# Patient Record
Sex: MALE | Race: WHITE | NOT HISPANIC OR LATINO | Employment: UNEMPLOYED | URBAN - NONMETROPOLITAN AREA
[De-identification: names, ages, dates, MRNs, and addresses within clinical notes are randomized per-mention and may not be internally consistent; named-entity substitution may affect disease eponyms.]

---

## 2017-01-09 ENCOUNTER — HOSPITAL ENCOUNTER (EMERGENCY)
Facility: HOSPITAL | Age: 22
Discharge: HOME | End: 2017-01-09
Attending: EMERGENCY MEDICINE | Admitting: EMERGENCY MEDICINE
Payer: COMMERCIAL

## 2017-01-09 VITALS
SYSTOLIC BLOOD PRESSURE: 133 MMHG | TEMPERATURE: 98.4 F | BODY MASS INDEX: 28.63 KG/M2 | WEIGHT: 200 LBS | DIASTOLIC BLOOD PRESSURE: 72 MMHG | HEART RATE: 92 BPM | OXYGEN SATURATION: 98 % | HEIGHT: 70 IN | RESPIRATION RATE: 18 BRPM

## 2017-01-09 DIAGNOSIS — J02.9 ACUTE VIRAL PHARYNGITIS: Primary | ICD-10-CM

## 2017-01-09 PROCEDURE — 99282 EMERGENCY DEPT VISIT SF MDM: CPT

## 2017-01-09 RX ORDER — ASPIRIN 325 MG
325 TABLET ORAL DAILY
COMMUNITY
End: 2018-01-31

## 2017-01-09 RX ORDER — DIPHENHYDRAMINE HCL 25 MG
50 TABLET ORAL ONCE
Status: COMPLETED | OUTPATIENT
Start: 2017-01-09 | End: 2017-01-09

## 2017-01-09 RX ORDER — ACETAMINOPHEN 325 MG/1
650 TABLET ORAL ONCE
Status: COMPLETED | OUTPATIENT
Start: 2017-01-09 | End: 2017-01-09

## 2017-01-09 RX ORDER — DIPHENHYDRAMINE HCL 25 MG
25 TABLET ORAL EVERY 6 HOURS PRN
Qty: 12 TABLET | Refills: 0 | Status: SHIPPED | OUTPATIENT
Start: 2017-01-09 | End: 2018-01-31

## 2017-01-09 RX ADMIN — DIPHENHYDRAMINE HCL 50 MG: 25 TABLET ORAL at 19:25

## 2017-01-09 RX ADMIN — ACETAMINOPHEN 650 MG: 325 TABLET, FILM COATED ORAL at 19:24

## 2018-01-31 ENCOUNTER — HOSPITAL ENCOUNTER (EMERGENCY)
Facility: HOSPITAL | Age: 23
Discharge: HOME | End: 2018-01-31
Attending: EMERGENCY MEDICINE | Admitting: EMERGENCY MEDICINE
Payer: COMMERCIAL

## 2018-01-31 VITALS
HEART RATE: 112 BPM | BODY MASS INDEX: 30.43 KG/M2 | SYSTOLIC BLOOD PRESSURE: 135 MMHG | OXYGEN SATURATION: 97 % | TEMPERATURE: 98.2 F | DIASTOLIC BLOOD PRESSURE: 85 MMHG | WEIGHT: 212.06 LBS | RESPIRATION RATE: 18 BRPM

## 2018-01-31 DIAGNOSIS — L08.9 SKIN INFECTION: ICD-10-CM

## 2018-01-31 DIAGNOSIS — Q38.1 SHORTENED FRENULUM OF TONGUE: Primary | ICD-10-CM

## 2018-01-31 PROCEDURE — 99283 EMERGENCY DEPT VISIT LOW MDM: CPT

## 2018-01-31 RX ORDER — LIDOCAINE HYDROCHLORIDE AND EPINEPHRINE 10; 10 MG/ML; UG/ML
1 INJECTION, SOLUTION INFILTRATION; PERINEURAL ONCE
Status: DISCONTINUED | OUTPATIENT
Start: 2018-01-31 | End: 2018-01-31

## 2018-01-31 RX ORDER — CEPHALEXIN 250 MG/1
1000 CAPSULE ORAL ONCE
Status: COMPLETED | OUTPATIENT
Start: 2018-01-31 | End: 2018-01-31

## 2018-01-31 RX ORDER — LIDOCAINE HYDROCHLORIDE AND EPINEPHRINE BITARTRATE 20; .01 MG/ML; MG/ML
1 INJECTION, SOLUTION SUBCUTANEOUS ONCE
Status: COMPLETED | OUTPATIENT
Start: 2018-01-31 | End: 2018-01-31

## 2018-01-31 RX ORDER — CEPHALEXIN 500 MG/1
500 CAPSULE ORAL 4 TIMES DAILY
Qty: 28 CAPSULE | Refills: 0 | Status: SHIPPED | OUTPATIENT
Start: 2018-01-31 | End: 2018-02-07

## 2018-01-31 RX ADMIN — LIDOCAINE HYDROCHLORIDE 15 ML: 20 SOLUTION ORAL; TOPICAL at 16:51

## 2018-01-31 RX ADMIN — CEPHALEXIN 1000 MG: 250 CAPSULE ORAL at 16:49

## 2018-01-31 RX ADMIN — LIDOCAINE HYDROCHLORIDE,EPINEPHRINE BITARTRATE 1 ML: 20; .01 INJECTION, SOLUTION INFILTRATION; PERINEURAL at 17:11

## 2018-01-31 NOTE — DISCHARGE INSTRUCTIONS
Keep area clean as discussed        Frenulectomy in [facility]:   What do I need to know about frenulectomy? Frenulectomy is surgery to remove a small piece of tissue called the frenulum  You may need surgery if the frenulum attached to the center of your upper lip is too thick and causes a large gap between your teeth  This can lead to your gums being pulled too far off your teeth (called gum recession)  You may also need this surgery if you have dentures that move because the frenulum pulls them out of place  Less commonly, the frenulum under your tongue may need to be removed  This surgery is more common in children  You may need this surgery if you had tongue-tie as a child that was not corrected and is causing speech problems  How do I prepare for surgery? Your healthcare provider will tell you how to prepare for surgery  The provider may tell you not to eat or drink anything after midnight on the day of surgery  The provider will tell you which medicines to take or not take before surgery  You may get an antibiotic through your IV to prevent a bacterial infection  What will happen during surgery? You will get local anesthesia to numb the area  You will be awake, but you should not feel pain  Your healthcare provider will hold your tongue or lip out of the way  The frenulum and some tissue around it will be cut with medical scissors, a laser, or an electrocautery device  This device is a needle that is heated by electricity  After the tissue is removed, the incision will be closed with stitches or with heat from the laser or device  What can I expect after surgery? You may have some mild pain, swelling, or bleeding after surgery  This is normal and should stop in a few days  If you received stitches, they will dissolve on their own  It may be painful or difficult for you to swallow after surgery, but it is important to drink liquids  Liquids help prevent dehydration   Ask your healthcare provider how much liquid to drink each day and which liquids are best for you  What are the risks of frenulectomy? You may bleed more than expected during surgery  CARE AGREEMENT:   You have the right to help plan your care  Learn about your health condition and how it may be treated  Discuss treatment options with your caregivers to decide what care you want to receive  You always have the right to refuse treatment  The above information is an  only  It is not intended as medical advice for individual conditions or treatments  Talk to your doctor, nurse or pharmacist before following any medical regimen to see if it is safe and effective for you  © 2017 [address]  Information is for End User's use only and may not be sold, redistributed or otherwise used for commercial purposes  All illustrations and images included in CareNotes® are the copyrighted property of [employer]  or [employer] Jason  Frenulectomy in Adults   WHAT YOU NEED TO KNOW:   Frenulectomy is surgery to remove a small piece of tissue called the frenulum  You may need surgery if the frenulum attached to the center of your upper lip is too thick and causes a large gap between your teeth  This can lead to your gums being pulled too far off your teeth (called gum recession)  You may also need this surgery if you have dentures that move because the frenulum pulls them out of place  Less commonly, the frenulum under your tongue may need to be removed  This surgery is more common in children  You may need this surgery if you had tongue-tie as a child that was not corrected and is causing speech problems  DISCHARGE INSTRUCTIONS:   Seek care immediately if:   · You have signs of dehydration  Examples include more thirst than usual, dry skin or lips, and urinating little or not at all  · You have bleeding from your stitches that is heavy, does not stop, or causes you to choke    Contact your healthcare provider if:   · You have a fever  · You have problems swallowing food  · You have problems saying some words or speaking  · You have questions or concerns about your condition or care  Medicines: You may need any of the following:  · Antibiotics  help prevent or treat a bacterial infection  · NSAIDs , such as ibuprofen, help decrease swelling, pain, and fever  This medicine is available with or without a doctor's order  NSAIDs can cause stomach bleeding or kidney problems in certain people  If you take blood thinner medicine, always ask your healthcare provider if NSAIDs are safe for you  Always read the medicine label and follow directions  · Take your medicine as directed  Contact your healthcare provider if you think your medicine is not helping or if you have side effects  Tell him or her if you are allergic to any medicine  Keep a list of the medicines, vitamins, and herbs you take  Include the amounts, and when and why you take them  Bring the list or the pill bottles to follow-up visits  Carry your medicine list with you in case of an emergency  Prevent dehydration:  It may be painful to swallow after surgery  Talk to your healthcare provider about the amount of liquid you need each day  Liquids help prevent dehydration  The provider may be able to suggest ways to make swallowing more comfortable  Follow up with your healthcare provider as directed:  Write down your questions so you remember to ask them during your visits  © 2017 [address] [employer] Information is for End User's use only and may not be sold, redistributed or otherwise used for commercial purposes  All illustrations and images included in CareNotes® are the copyrighted property of A [employer] M [employer]  or [employer]  The above information is an  only  It is not intended as medical advice for individual conditions or treatments   Talk to your doctor, nurse or pharmacist before following any medical regimen to see if it is safe and effective for you

## 2018-01-31 NOTE — ED PROVIDER NOTES
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 -has had frenulum to tip of tongue  In last 1 1/2 month has had small tears  At times  PTA  Pt relates during oral sex with his boyfriend had sudden pain/bleeding and tear of fenulum  History provided by:  Patient  Mouth Injury   Onset quality:  Sudden  Associated symptoms: no chest pain, no congestion, no ear pain, no headaches, no nausea, no rash, no shortness of breath, no sore throat and no vomiting        None       History reviewed  No pertinent past medical history  History reviewed  No pertinent surgical history  History reviewed  No pertinent family history  I have reviewed and agree with the history as documented  Social History   Substance Use Topics    Smoking status: Current Every Day Smoker     Types: E-Cigarettes    Smokeless tobacco: Never Used    Alcohol use Yes      Comment: social        Review of Systems   Constitutional: Negative  Negative for activity change, chills and diaphoresis  HENT: Negative for congestion, drooling, ear pain, facial swelling, nosebleeds, sore throat, trouble swallowing and voice change  Eyes: Negative  Negative for pain and visual disturbance  Respiratory: Negative  Negative for choking and shortness of breath  Cardiovascular: Negative  Negative for chest pain  Gastrointestinal: Negative  Negative for nausea and vomiting  Genitourinary: Negative  Musculoskeletal: Negative  Skin: Positive for wound  Negative for pallor and rash  Neurological: Negative  Negative for dizziness, tremors, syncope, facial asymmetry, speech difficulty, numbness and headaches  Psychiatric/Behavioral: The patient is nervous/anxious  All other systems reviewed and are negative        Physical Exam  ED Triage Vitals [01/31/18 1514]   Temperature Pulse Respirations Blood Pressure SpO2   98 2 °F (36 8 °C) (!) 112 18 135/85 97 %      Temp Source Heart Rate Source Patient Position - Orthostatic VS BP Location FiO2 (%)   Temporal Left Sitting Left arm --      Pain Score       8           Orthostatic Vital Signs  Vitals:    01/31/18 1514   BP: 135/85   Pulse: (!) 112   Patient Position - Orthostatic VS: Sitting       Physical Exam   Constitutional: He is oriented to person, place, and time  He appears well-developed and well-nourished  He is active and cooperative  Non-toxic appearance  He does not have a sickly appearance  He does not appear ill  No distress  HENT:   Head: Normocephalic and atraumatic  Right Ear: Hearing normal    Left Ear: Hearing normal    Nose: Nose normal    Mouth/Throat: Uvula is midline and oropharynx is clear and moist  Mucous membranes are not dry and not cyanotic  No trismus in the jaw  No uvula swelling  No oropharyngeal exudate, posterior oropharyngeal edema or posterior oropharyngeal erythema  Eyes: Conjunctivae and EOM are normal  Pupils are equal, round, and reactive to light  Neck: Normal range of motion  Neck supple  No JVD present  No spinous process tenderness and no muscular tenderness present  Cardiovascular: Normal rate, regular rhythm, intact distal pulses and normal pulses  No extrasystoles are present  Pulmonary/Chest: Effort normal  No stridor  No respiratory distress  He has no wheezes  He has no rhonchi  He has no rales  Abdominal: Soft  Bowel sounds are normal  There is no tenderness  There is no rigidity, no guarding and no CVA tenderness  Neurological: He is alert and oriented to person, place, and time  He has normal strength and normal reflexes  No cranial nerve deficit  Skin: Skin is warm and dry  No petechiae and no rash noted  He is not diaphoretic  No cyanosis  No pallor  Psychiatric: His speech is normal and behavior is normal  His mood appears anxious  Cognition and memory are normal    Vitals reviewed        ED Medications  Medications   cephalexin (KEFLEX) capsule 1,000 mg (1,000 mg Oral Given 1/31/18 1649)   lidocaine-epinephrine (XYLOCAINE/EPINEPHRINE) 2 %-1:100,000 injection 1 mL (1 mL Infiltration Given 1/31/18 1711)   lidocaine viscous (XYLOCAINE) 2 % mucosal solution 15 mL (15 mL Swish & Spit Given 1/31/18 1651)       Diagnostic Studies  Results Reviewed     None                 No orders to display              Procedures  Procedures   1/31 at 1650  Frenulectomy   Discussed procedure and pt gives verbal permission  Discussed possible bleeding , pain, deformity  Pt iD with verbal and arm band  2 % lidocaine with epi used for local anesthsia  With good results  Frenulum cut back to base of tongue and small residual piece of skin removed from anterio prior tear  There was miniaaml bleeding - controlled with pressure  Pt tolerated well and we discussed home care  Pt placed on ABX    Phone Contacts  ED Phone Contact    ED Course  ED Course                                MDM  CritCare Time    Disposition  Final diagnoses:   Skin infection - wrong diagnosis   Shortened frenulum of tongue - with tear- frenulectomy performed     Time reflects when diagnosis was documented in both MDM as applicable and the Disposition within this note     Time User Action Codes Description Comment    1/31/2018  5:22 PM Genevieve Charan Add [L08 9] Skin infection     2/2/2018  2:36 PM Genevieve Charan Modify [L08 9] Skin infection wrong diagnosis    2/2/2018  2:36 PM Genevieve Charan Add [Q38 1] Shortened frenulum of tongue     2/2/2018  2:37 PM Genevieve Charan Modify [Q38 1] Shortened frenulum of tongue with tear    2/2/2018  2:38 PM Genevieve Charan Modify [Q38 1] Shortened frenulum of tongue with tear- frenulectomy performed      ED Disposition     ED Disposition Condition Comment    Discharge  Michelle Hilton discharge to home/self care      Condition at discharge: Good        Follow-up Information     Follow up With Specialties Details Why Contact Info Additional Information    Shelly Puente DO Family Medicine   [address] [facility] [address] Emergency Department Emergency Medicine   [address] [id number]  [phone] [place] [facility], [place] [address], [place], [place], [address]        Discharge Medication List as of 1/31/2018  5:25 PM      START taking these medications    Details   cephalexin (KEFLEX) 500 mg capsule Take 1 capsule (500 mg total) by mouth 4 (four) times a day for 7 days, Starting Wed 1/31/2018, Until Wed 2/7/2018, Print           No discharge procedures on file      ED Provider  Electronically Signed by           Xuan Chadwick DO  02/02/18 6739

## 2018-02-03 ENCOUNTER — HOSPITAL ENCOUNTER (EMERGENCY)
Facility: HOSPITAL | Age: 23
Discharge: HOME | End: 2018-02-03
Attending: EMERGENCY MEDICINE | Admitting: EMERGENCY MEDICINE
Payer: COMMERCIAL

## 2018-02-03 VITALS
HEART RATE: 92 BPM | TEMPERATURE: 97.9 F | WEIGHT: 211.86 LBS | BODY MASS INDEX: 30.33 KG/M2 | HEIGHT: 70 IN | SYSTOLIC BLOOD PRESSURE: 134 MMHG | RESPIRATION RATE: 18 BRPM | DIASTOLIC BLOOD PRESSURE: 82 MMHG | OXYGEN SATURATION: 99 %

## 2018-02-03 DIAGNOSIS — S91.332A PUNCTURE WOUND TO FOOT, LEFT, INITIAL ENCOUNTER: Primary | ICD-10-CM

## 2018-02-03 PROCEDURE — 99282 EMERGENCY DEPT VISIT SF MDM: CPT

## 2018-02-03 PROCEDURE — 90471 IMMUNIZATION ADMIN: CPT

## 2018-02-03 PROCEDURE — 90715 TDAP VACCINE 7 YRS/> IM: CPT | Performed by: EMERGENCY MEDICINE

## 2018-02-03 RX ORDER — OSELTAMIVIR PHOSPHATE 75 MG/1
75 CAPSULE ORAL EVERY 12 HOURS SCHEDULED
Qty: 10 CAPSULE | Refills: 0 | Status: SHIPPED | OUTPATIENT
Start: 2018-02-03 | End: 2018-02-03

## 2018-02-03 RX ADMIN — TETANUS TOXOID, REDUCED DIPHTHERIA TOXOID AND ACELLULAR PERTUSSIS VACCINE, ADSORBED 0.5 ML: 5; 2.5; 8; 8; 2.5 SUSPENSION INTRAMUSCULAR at 15:09
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
## 2018-02-03 NOTE — DISCHARGE INSTRUCTIONS
Puncture Wound   WHAT YOU NEED TO KNOW:   A puncture wound is a hole in the skin made by a sharp, pointed object  DISCHARGE INSTRUCTIONS:   Return to the emergency department if:   · You have severe pain  · You have numbness or tingling in the area of your wound  · Your wound starts bleeding and does not stop, even after you apply pressure  Contact your healthcare provider if:   · You have new drainage or a bad odor coming from the wound  · You have a fever  · You have increased swelling, redness, or pain  · You have red streaks on your skin coming from your wound  · You have questions or concerns about your condition or care  Medicines: You may need any of the following:  · NSAIDs , such as ibuprofen, help decrease swelling, pain, and fever  This medicine is available with or without a doctor's order  NSAIDs can cause stomach bleeding or kidney problems in certain people  If you take blood thinner medicine, always ask your healthcare provider if NSAIDs are safe for you  Always read the medicine label and follow directions  · Antibiotics  help treat a bacterial infection  · Take your medicine as directed  Contact your healthcare provider if you think your medicine is not helping or if you have side effects  Tell him of her if you are allergic to any medicine  Keep a list of the medicines, vitamins, and herbs you take  Include the amounts, and when and why you take them  Bring the list or the pill bottles to follow-up visits  Carry your medicine list with you in case of an emergency  Wound care:  Keep your wound clean and dry  When you are allowed to bathe, carefully wash the wound with soap and water  Dry the area and put on new, clean bandages as directed  Change your bandages when they get wet or dirty  Manage your symptoms:   · Rest  your injured area as much as possible  If the puncture wound is in your leg or foot, use crutches as directed   This will help keep the weight off your injured leg or foot as it heals  · Elevate  your injured area above the level of your heart as often as you can  This will help decrease swelling and pain  Prop your injured area on pillows or blankets to keep it elevated comfortably  Follow up with your healthcare provider in 2 to 3 days:  Write down your questions so you remember to ask them during your visits  © 2017 [address] [employer] Information is for End User's use only and may not be sold, redistributed or otherwise used for commercial purposes  All illustrations and images included in CareNotes® are the copyrighted property of [employer]  or [employer]  The above information is an  only  It is not intended as medical advice for individual conditions or treatments  Talk to your doctor, nurse or pharmacist before following any medical regimen to see if it is safe and effective for you